# Patient Record
Sex: MALE | Race: OTHER | URBAN - METROPOLITAN AREA
[De-identification: names, ages, dates, MRNs, and addresses within clinical notes are randomized per-mention and may not be internally consistent; named-entity substitution may affect disease eponyms.]

---

## 2018-01-01 ENCOUNTER — EMERGENCY (EMERGENCY)
Facility: HOSPITAL | Age: 78
LOS: 1 days | Discharge: HOME | End: 2018-01-01
Attending: EMERGENCY MEDICINE | Admitting: EMERGENCY MEDICINE
Payer: MEDICARE

## 2018-01-01 VITALS — TEMPERATURE: 99 F

## 2018-01-01 VITALS — WEIGHT: 169.98 LBS

## 2018-01-01 DIAGNOSIS — Z90.49 ACQUIRED ABSENCE OF OTHER SPECIFIED PARTS OF DIGESTIVE TRACT: Chronic | ICD-10-CM

## 2018-01-01 PROCEDURE — 99285 EMERGENCY DEPT VISIT HI MDM: CPT | Mod: 25

## 2018-01-01 PROCEDURE — 92950 HEART/LUNG RESUSCITATION CPR: CPT

## 2018-07-31 NOTE — ED PROVIDER NOTE - OBJECTIVE STATEMENT
77 y o male with PMHX of COPD, BPH, and s/p recent cholecystectomy (complicated by fecal peritonitis which was treated with wet to dry packing and IV abx. Done at [facility]/  - admitted to [facility] on 7/19) was bib EMS after cc of chest pain in [facility] nursing home and unresponsiveness. CPR was applied by EMS for 7 min PTA and pt was intubated with a Emigdio tube. x5 epinephrines (1 g each) and sodium bicarbonate was administered on the field. Full code by ausculation and title CO2 detector. Rectal temperature measured 99 F.   Contact information: Geo Adams [phone], [phone]  [facility] left messages on both phones.

## 2018-07-31 NOTE — ED PROVIDER NOTE - PROGRESS NOTE DETAILS
PEA, CPR continued. 180 finger stick. Epinephrine #2 administered. PEA CPR for and ACLS was continued w/ no return of pulse. TOD at 13:49 called by Dr. Florez. Pupils are fixed dilated to confirm. Epinephrine #1 administered. Sodium bicarb and Calcium chloride administered. Spoke with the DEIDRE Benitez (niece who lives in [place]) - confirms that patient had surgery for "peritonitis" a few weeks ago.  Called his PMD and left a message for Dr. Damian.  Will contact ME to see if this case would be accepted for review. Spoke with the DEIDER Benitez (niece who lives in [place]) - confirms that patient had surgery for "peritonitis" a few weeks ago.  Called his PMD and left a message for Dr. Damian.  Spoke with ME Claudia and is not an ME accepted case.  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

## 2018-07-31 NOTE — ED ADULT NURSE NOTE - OBJECTIVE STATEMENT
arrives by EMS for cardiac arrest, down time prior to arrival was 50mins as per ems. Pt arrives with Emigdio tube in place, secretions present. CPR in progress. Was arrives by EMS for cardiac arrest, down time prior to arrival was 50mins as per ems. Pt arrives with Emigdio tube in place, secretions present with colostomy bag present and lópez catheter with little to no urine present. CPR in progress. as per EMS pt was given 5 Epinephrine IV, 1 Gram of Calcium Chloride, Sodium Bicarb and was found in PEA, at [facility]. Pt arrives pale, unresponsive, cool to touch.

## 2018-07-31 NOTE — ED PROVIDER NOTE - CONSTITUTIONAL, MLM
normal... Pt arrived by EMS, required chest compressions. Unresponsive and intubated with Emigdio tube at time of arrival.

## 2018-07-31 NOTE — ED ADULT NURSE NOTE - NSIMPLEMENTINTERV_GEN_ALL_ED
Implemented All Fall with Harm Risk Interventions:  [place] to call system. Call bell, personal items and telephone within reach. Instruct patient to call for assistance. Room bathroom lighting operational. Non-slip footwear when patient is off stretcher. Physically safe environment: no spills, clutter or unnecessary equipment. Stretcher in lowest position, wheels locked, appropriate side rails in place. Provide visual cue, wrist band, yellow gown, etc. Monitor gait and stability. Monitor for mental status changes and reorient to person, place, and time. Review medications for side effects contributing to fall risk. Reinforce activity limits and safety measures with patient and family. Provide visual clues: red socks.

## 2018-07-31 NOTE — ED BEHAVIORAL HEALTH NOTE - BEHAVIORAL HEALTH NOTE
Sw was consulted to the Ed due to a patients passing. This writer spoke to the patients Zack Benitez ( [phone]) after she was informed of her uncles passing. She was emotional and sounded tearful over the phone. She currently lives in [place] and will be trying to arrange things from there in regards to the patients wishes of being cremated. She stated that she is unsure if her uncle had a will as she was just here and they were talking about those things. This worker advised that she speak with an elder care  just to see what would happen in regards to his estate and how it would pass to her as the only living next of kin. She was provided via email ([email or web address])  with some elder care attorneys that she could contact with those questions. She was also provided with a list of  homes that maybe able to assist her with having him cremated here and than have his remains sent to her in [place]. She was also sent a form for burial assistance should she need it. Worker made available for any further assistance needed. Sw was consulted to the Ed due to a patients passing. This writer spoke to the patients Zack Benitez ( [phone]) after she was informed of her uncles passing. She was emotional and sounded tearful over the phone. She currently lives in [place] and will be trying to arrange things from there in regards to the patients wishes of being cremated. She stated that she is unsure if her uncle had a will as she was just here and they were talking about those things. This worker advised that she speak with an elder care  just to see what would happen in regards to his estate and how it would pass to her as the only living next of kin. She was provided via email ([email or web address])  with some elder care attorneys that she could contact with those questions. She was also provided with a list of  homes that maybe able to assist her with having him cremated here and than have his remains sent to her in [place]. She was also sent a form for burial assistance should she need it. Worker made available for any further assistance needed.    Update: Patient relative information: Sister: Alcira- [phone] Sw was consulted to the Ed due to a patients passing. This writer spoke to the patients Zack Benitez ( [phone]) after she was informed of her uncles passing. She was emotional and sounded tearful over the phone. She currently lives in [place] and will be trying to arrange things from there in regards to the patients wishes of being cremated. She stated that she is unsure if her uncle had a will as she was just here and they were talking about those things. This worker advised that she speak with an elder care  just to see what would happen in regards to his estate and how it would pass to her as the only living next of kin. She was provided via email ([email or web address])  with some elder care attorneys that she could contact with those questions. She was also provided with a list of  homes that maybe able to assist her with having him cremated here and than have his remains sent to her in [place]. She was also sent a form for burial assistance should she need it. Worker made available for any further assistance needed.    Update: Patient relative information: Sister: Alcira Veliz- [phone]

## 2018-07-31 NOTE — ED PROVIDER NOTE - CRITICAL CARE PROVIDED
documentation/additional history taking/direct patient care (not related to procedure)/consultation with other physicians

## 2018-08-04 DIAGNOSIS — J44.9 CHRONIC OBSTRUCTIVE PULMONARY DISEASE, UNSPECIFIED: ICD-10-CM

## 2018-08-04 DIAGNOSIS — I46.9 CARDIAC ARREST, CAUSE UNSPECIFIED: ICD-10-CM

## 2022-08-26 NOTE — ED PROVIDER NOTE - NS ED NOTE AC HIGH RISK COUNTRIES
Body Location Override (Optional - Billing Will Still Be Based On Selected Body Map Location If Applicable): left dorsal foot Detail Level: Detailed Size Of Lesion: 0.2cm Unable to Assess